# Patient Record
(demographics unavailable — no encounter records)

---

## 2024-11-14 NOTE — IMMUNIZATIONS
[Immunizations are up to date] : Immunizations are up to date [Records maintained by [facility]] : Records maintained by [facility]

## 2024-11-15 NOTE — PHYSICAL EXAM
[PERRLA] : VANI [S1, S2 Present] : S1, S2 present [Clear to auscultation] : clear to auscultation [Soft] : soft [NonTender] : non tender [Non Distended] : non distended [Normal Bowel Sounds] : normal bowel sounds [No Hepatosplenomegaly] : no hepatosplenomegaly [No Abnormal Lymph Nodes Palpated] : no abnormal lymph nodes palpated [Range Of Motion] : full range of motion [Intact Judgement] : intact judgement  [Insight Insight] : intact insight [Cranial nerves grossly intact] : cranial nerves grossly intact [Not Examined] : not examined [Acute distress] : no acute distress [Erythematous Conjunctiva] : nonerythematous conjunctiva [Erythematous Oropharynx] : nonerythematous oropharynx [Lesions] : no lesions [Murmurs] : no murmurs [Joint effusions] : no joint effusions [FreeTextEntry1] : well-appearing [de-identified] : no signs of arthritis, +patellar grind of left knee [_______] : Knee: [unfilled] [de-identified] : FROM, no tenderness [de-identified] : no tenderness [de-identified] : no tenderness [de-identified] : no tenderness [Thumbs bend back to reach forearm] : thumbs bend back to reach forearm [Hyperextension of elbows] : hyperextension of elbows  [Hyperextension of knees] : hyperextension of knees [Pronated flat feet] : pronated flat feet

## 2024-11-15 NOTE — CONSULT LETTER
[Dear  ___] : Dear  [unfilled], [Courtesy Letter:] : I had the pleasure of seeing your patient, [unfilled], in my office today. [Please see my note below.] : Please see my note below. [Consult Closing:] : Thank you very much for allowing me to participate in the care of this patient.  If you have any questions, please do not hesitate to contact me. [Sincerely,] : Sincerely, [FreeTextEntry2] : Carito Horne DO [address] [id number] [FreeTextEntry3] : Sussy Orta MD Attending Physician, Pediatric Rheumatology [facility] | [facility]

## 2024-11-15 NOTE — PHYSICAL EXAM
[PERRLA] : VANI [S1, S2 Present] : S1, S2 present [Clear to auscultation] : clear to auscultation [Soft] : soft [NonTender] : non tender [Non Distended] : non distended [Normal Bowel Sounds] : normal bowel sounds [No Hepatosplenomegaly] : no hepatosplenomegaly [No Abnormal Lymph Nodes Palpated] : no abnormal lymph nodes palpated [Range Of Motion] : full range of motion [Intact Judgement] : intact judgement  [Insight Insight] : intact insight [Cranial nerves grossly intact] : cranial nerves grossly intact [Not Examined] : not examined [Acute distress] : no acute distress [Erythematous Conjunctiva] : nonerythematous conjunctiva [Erythematous Oropharynx] : nonerythematous oropharynx [Lesions] : no lesions [Murmurs] : no murmurs [Joint effusions] : no joint effusions [FreeTextEntry1] : well-appearing [de-identified] : no signs of arthritis, +patellar grind of left knee [_______] : Knee: [unfilled] [de-identified] : FROM, no tenderness [de-identified] : no tenderness [de-identified] : no tenderness [de-identified] : no tenderness [Thumbs bend back to reach forearm] : thumbs bend back to reach forearm [Hyperextension of elbows] : hyperextension of elbows  [Hyperextension of knees] : hyperextension of knees [Pronated flat feet] : pronated flat feet

## 2024-11-15 NOTE — HISTORY OF PRESENT ILLNESS
[Unlimited ADLs] : able to do activities of daily living without limitations [FreeTextEntry1] : Irving is a 16-year-old male who presents for evaluation of joint pain.   Irving has a long standing history of hypermobility. He reports that he has had shoulder pain (R >> L) for the past year - he has been following with a sports medicine orthopedist for the past year and has been doing PT for his shoulder with significant iprovement in pain. On occassion he notes some pain in his shoulders when sleeping, but otherwise doing well.   About 7 months ago, he develop pain in his knees (R >> L ). He reports that pain worsened with activity and stairs. He was playing volleyball almost daily with friends until about 1 month ago. He stopped due to the pain. No significant injury but he reports jumping a lot during volleyball. MRI of bilateral knees on 9/28/24 with right patellar subluxation and patellar tendinosis with focal partial-thickness tear and left with mild patellar tendinosis and mild lateral patellar subluxation. He started PT about 1 month ago for knees and has noted improvement yet - going 2x/week. No visible swelling. Wakes up feeling 'stiff in entire body.' Tried Advil but didn't feel it helped with pain.   Most recent labs done on 1/5/24 with PMD: CBC with WBC 13.55 and CMP, ferritin, lipid panel and TSH wnl.   No fever, headache, visual changes, mouth sores, cough, congestion, chest pain, difficulty breathing, nausea, vomiting, diarrhea, constipation, blood in the stool, abdominal pain, dysuria, hematuria, joint pain, joint swelling, morning stiffness, back pain, or rash. No easy bruising or skin laxity.   Past Medical History: None  Past Surgical History: None  Family History:  Social History: 11th grade. Lives with parents, 1 brother, and 1 sister.  Medications: None  Allergies: NKDA

## 2024-11-15 NOTE — CONSULT LETTER
[Dear  ___] : Dear  [unfilled], [Courtesy Letter:] : I had the pleasure of seeing your patient, [unfilled], in my office today. [Please see my note below.] : Please see my note below. [Consult Closing:] : Thank you very much for allowing me to participate in the care of this patient.  If you have any questions, please do not hesitate to contact me. [Sincerely,] : Sincerely, [FreeTextEntry2] : Carito Horne DO [address] [id number] [FreeTextEntry3] : Sussy Orta MD Attending Physician, Pediatric Rheumatology [facility] | [facility]